# Patient Record
Sex: FEMALE | Race: WHITE | NOT HISPANIC OR LATINO
[De-identification: names, ages, dates, MRNs, and addresses within clinical notes are randomized per-mention and may not be internally consistent; named-entity substitution may affect disease eponyms.]

---

## 2017-01-17 ENCOUNTER — APPOINTMENT (OUTPATIENT)
Dept: OBGYN | Facility: CLINIC | Age: 22
End: 2017-01-17

## 2017-01-17 VITALS
WEIGHT: 151 LBS | BODY MASS INDEX: 26.75 KG/M2 | SYSTOLIC BLOOD PRESSURE: 103 MMHG | HEIGHT: 63 IN | DIASTOLIC BLOOD PRESSURE: 65 MMHG | HEART RATE: 68 BPM

## 2017-01-17 DIAGNOSIS — Z01.419 ENCOUNTER FOR GYNECOLOGICAL EXAMINATION (GENERAL) (ROUTINE) W/OUT ABNORMAL FINDINGS: ICD-10-CM

## 2017-01-17 DIAGNOSIS — N76.0 ACUTE VAGINITIS: ICD-10-CM

## 2017-01-17 DIAGNOSIS — Z30.09 ENCOUNTER FOR OTHER GENERAL COUNSELING AND ADVICE ON CONTRACEPTION: ICD-10-CM

## 2017-01-17 DIAGNOSIS — B96.89 ACUTE VAGINITIS: ICD-10-CM

## 2017-01-24 LAB
C TRACH DNA SPEC QL NAA+PROBE: NORMAL
C TRACH RRNA SPEC QL NAA+PROBE: NORMAL
CYTOLOGY CVX/VAG DOC THIN PREP: NORMAL
HBV SURFACE AG SER QL: NONREACTIVE
HCV AB SER QL: NONREACTIVE
HCV S/CO RATIO: 0.14 S/CO
HIV1+2 AB SPEC QL IA.RAPID: NONREACTIVE
N GONORRHOEA DNA SPEC QL NAA+PROBE: NORMAL
N GONORRHOEA RRNA SPEC QL NAA+PROBE: NORMAL
SOURCE AMPLIFICATION: NORMAL
T PALLIDUM AB SER QL IA: NEGATIVE

## 2017-07-13 ENCOUNTER — APPOINTMENT (OUTPATIENT)
Dept: ORTHOPEDIC SURGERY | Facility: CLINIC | Age: 22
End: 2017-07-13

## 2017-07-13 VITALS
HEIGHT: 63 IN | WEIGHT: 145 LBS | SYSTOLIC BLOOD PRESSURE: 110 MMHG | BODY MASS INDEX: 25.69 KG/M2 | DIASTOLIC BLOOD PRESSURE: 72 MMHG | HEART RATE: 69 BPM

## 2017-09-05 ENCOUNTER — RX RENEWAL (OUTPATIENT)
Age: 22
End: 2017-09-05

## 2017-10-20 ENCOUNTER — APPOINTMENT (OUTPATIENT)
Dept: ORTHOPEDIC SURGERY | Facility: CLINIC | Age: 22
End: 2017-10-20
Payer: COMMERCIAL

## 2017-10-20 PROCEDURE — 99213 OFFICE O/P EST LOW 20 MIN: CPT

## 2017-12-20 ENCOUNTER — TRANSCRIPTION ENCOUNTER (OUTPATIENT)
Age: 22
End: 2017-12-20

## 2018-01-19 ENCOUNTER — APPOINTMENT (OUTPATIENT)
Dept: OBGYN | Facility: CLINIC | Age: 23
End: 2018-01-19
Payer: COMMERCIAL

## 2018-01-19 VITALS
WEIGHT: 154 LBS | HEIGHT: 63 IN | SYSTOLIC BLOOD PRESSURE: 115 MMHG | DIASTOLIC BLOOD PRESSURE: 76 MMHG | HEART RATE: 68 BPM | BODY MASS INDEX: 27.29 KG/M2

## 2018-01-19 DIAGNOSIS — Z87.42 PERSONAL HISTORY OF OTHER DISEASES OF THE FEMALE GENITAL TRACT: ICD-10-CM

## 2018-01-19 DIAGNOSIS — Z78.9 OTHER SPECIFIED HEALTH STATUS: ICD-10-CM

## 2018-01-19 PROCEDURE — 99395 PREV VISIT EST AGE 18-39: CPT

## 2018-01-22 ENCOUNTER — OTHER (OUTPATIENT)
Age: 23
End: 2018-01-22

## 2018-01-22 LAB
C TRACH RRNA SPEC QL NAA+PROBE: NOT DETECTED
N GONORRHOEA RRNA SPEC QL NAA+PROBE: NOT DETECTED
SOURCE AMPLIFICATION: NORMAL

## 2018-04-26 ENCOUNTER — APPOINTMENT (OUTPATIENT)
Dept: ORTHOPEDIC SURGERY | Facility: CLINIC | Age: 23
End: 2018-04-26

## 2018-05-01 ENCOUNTER — FORM ENCOUNTER (OUTPATIENT)
Age: 23
End: 2018-05-01

## 2018-05-02 ENCOUNTER — APPOINTMENT (OUTPATIENT)
Dept: MRI IMAGING | Facility: CLINIC | Age: 23
End: 2018-05-02
Payer: COMMERCIAL

## 2018-05-02 ENCOUNTER — OUTPATIENT (OUTPATIENT)
Dept: OUTPATIENT SERVICES | Facility: HOSPITAL | Age: 23
LOS: 1 days | End: 2018-05-02
Payer: COMMERCIAL

## 2018-05-02 DIAGNOSIS — Z00.8 ENCOUNTER FOR OTHER GENERAL EXAMINATION: ICD-10-CM

## 2018-05-02 PROCEDURE — 73721 MRI JNT OF LWR EXTRE W/O DYE: CPT | Mod: 26,RT

## 2018-05-02 PROCEDURE — 73721 MRI JNT OF LWR EXTRE W/O DYE: CPT

## 2018-05-09 ENCOUNTER — APPOINTMENT (OUTPATIENT)
Dept: ORTHOPEDIC SURGERY | Facility: CLINIC | Age: 23
End: 2018-05-09
Payer: COMMERCIAL

## 2018-05-09 VITALS
DIASTOLIC BLOOD PRESSURE: 67 MMHG | HEART RATE: 66 BPM | WEIGHT: 150 LBS | HEIGHT: 63 IN | SYSTOLIC BLOOD PRESSURE: 107 MMHG | BODY MASS INDEX: 26.58 KG/M2

## 2018-05-09 DIAGNOSIS — Z78.9 OTHER SPECIFIED HEALTH STATUS: ICD-10-CM

## 2018-05-09 PROCEDURE — 99214 OFFICE O/P EST MOD 30 MIN: CPT

## 2018-05-17 ENCOUNTER — APPOINTMENT (OUTPATIENT)
Dept: ORTHOPEDIC SURGERY | Facility: CLINIC | Age: 23
End: 2018-05-17
Payer: COMMERCIAL

## 2018-05-17 VITALS — BODY MASS INDEX: 26.58 KG/M2 | HEIGHT: 63 IN | WEIGHT: 150 LBS

## 2018-05-17 PROCEDURE — 99214 OFFICE O/P EST MOD 30 MIN: CPT

## 2018-05-18 ENCOUNTER — OTHER (OUTPATIENT)
Age: 23
End: 2018-05-18

## 2018-05-21 ENCOUNTER — APPOINTMENT (OUTPATIENT)
Dept: CT IMAGING | Facility: CLINIC | Age: 23
End: 2018-05-21
Payer: COMMERCIAL

## 2018-05-21 ENCOUNTER — OUTPATIENT (OUTPATIENT)
Dept: OUTPATIENT SERVICES | Facility: HOSPITAL | Age: 23
LOS: 1 days | End: 2018-05-21
Payer: COMMERCIAL

## 2018-05-21 DIAGNOSIS — M22.41 CHONDROMALACIA PATELLAE, RIGHT KNEE: ICD-10-CM

## 2018-05-21 DIAGNOSIS — M25.569 PAIN IN UNSPECIFIED KNEE: ICD-10-CM

## 2018-05-21 DIAGNOSIS — S83.011D LATERAL SUBLUXATION OF RIGHT PATELLA, SUBSEQUENT ENCOUNTER: ICD-10-CM

## 2018-05-21 PROCEDURE — 73700 CT LOWER EXTREMITY W/O DYE: CPT

## 2018-05-21 PROCEDURE — 73700 CT LOWER EXTREMITY W/O DYE: CPT | Mod: 26,RT

## 2018-05-23 ENCOUNTER — APPOINTMENT (OUTPATIENT)
Dept: ORTHOPEDIC SURGERY | Facility: CLINIC | Age: 23
End: 2018-05-23
Payer: COMMERCIAL

## 2018-05-23 PROCEDURE — 99214 OFFICE O/P EST MOD 30 MIN: CPT

## 2018-06-07 ENCOUNTER — OUTPATIENT (OUTPATIENT)
Dept: OUTPATIENT SERVICES | Facility: HOSPITAL | Age: 23
LOS: 1 days | End: 2018-06-07

## 2018-06-07 VITALS
DIASTOLIC BLOOD PRESSURE: 60 MMHG | RESPIRATION RATE: 14 BRPM | WEIGHT: 151.9 LBS | TEMPERATURE: 98 F | HEART RATE: 70 BPM | SYSTOLIC BLOOD PRESSURE: 98 MMHG | HEIGHT: 64 IN

## 2018-06-07 DIAGNOSIS — Z90.89 ACQUIRED ABSENCE OF OTHER ORGANS: Chronic | ICD-10-CM

## 2018-06-07 DIAGNOSIS — M24.9 JOINT DERANGEMENT, UNSPECIFIED: ICD-10-CM

## 2018-06-07 DIAGNOSIS — M25.361 OTHER INSTABILITY, RIGHT KNEE: ICD-10-CM

## 2018-06-07 DIAGNOSIS — Z96.22 MYRINGOTOMY TUBE(S) STATUS: Chronic | ICD-10-CM

## 2018-06-07 LAB
HCT VFR BLD CALC: 39.6 % — SIGNIFICANT CHANGE UP (ref 34.5–45)
HGB BLD-MCNC: 13.6 G/DL — SIGNIFICANT CHANGE UP (ref 11.5–15.5)
MCHC RBC-ENTMCNC: 30.5 PG — SIGNIFICANT CHANGE UP (ref 27–34)
MCHC RBC-ENTMCNC: 34.3 % — SIGNIFICANT CHANGE UP (ref 32–36)
MCV RBC AUTO: 88.8 FL — SIGNIFICANT CHANGE UP (ref 80–100)
NRBC # FLD: 0 — SIGNIFICANT CHANGE UP
PLATELET # BLD AUTO: 240 K/UL — SIGNIFICANT CHANGE UP (ref 150–400)
PMV BLD: 10.4 FL — SIGNIFICANT CHANGE UP (ref 7–13)
RBC # BLD: 4.46 M/UL — SIGNIFICANT CHANGE UP (ref 3.8–5.2)
RBC # FLD: 11.9 % — SIGNIFICANT CHANGE UP (ref 10.3–14.5)
WBC # BLD: 5.09 K/UL — SIGNIFICANT CHANGE UP (ref 3.8–10.5)
WBC # FLD AUTO: 5.09 K/UL — SIGNIFICANT CHANGE UP (ref 3.8–10.5)

## 2018-06-07 NOTE — H&P PST ADULT - NSANTHOSAYNRD_GEN_A_CORE
No. TONY screening performed.  STOP BANG Legend: 0-2 = LOW Risk; 3-4 = INTERMEDIATE Risk; 5-8 = HIGH Risk

## 2018-06-07 NOTE — H&P PST ADULT - MUSCULOSKELETAL COMMENTS
right knee no cartilage, patella deviated pain with rom right knee no cartilage, patella deviated, pain with climbing stairs

## 2018-06-07 NOTE — H&P PST ADULT - NEGATIVE GENERAL SYMPTOMS
no fever/no sweating/no chills/no anorexia/no weight gain/no polyphagia/no polyuria/no malaise/no weight loss/no polydipsia/no fatigue

## 2018-06-07 NOTE — H&P PST ADULT - RS GEN PE MLT RESP DETAILS PC
no chest wall tenderness/no rhonchi/no wheezes/respirations non-labored/good air movement/breath sounds equal/no rales/clear to auscultation bilaterally/no intercostal retractions

## 2018-06-07 NOTE — H&P PST ADULT - PROBLEM SELECTOR PLAN 1
Pt scheduled for right knee dx knee arthroscopy, extra articular ligament reconstruction medial patellofemoral ligament reconstruction on 6/20/2018.  labs done results pending.  Urine cup provided.  Preop teaching done, pt able to verbalize understanding.

## 2018-06-07 NOTE — H&P PST ADULT - NEGATIVE BREAST SYMPTOMS
no breast lump L/no nipple discharge L/no breast tenderness L/no breast lump R/no nipple discharge R

## 2018-06-07 NOTE — H&P PST ADULT - GASTROINTESTINAL DETAILS
no bruit/no guarding/nontender/no rebound tenderness/no rigidity/no organomegaly/no masses palpable/bowel sounds normal/soft

## 2018-06-07 NOTE — H&P PST ADULT - NEGATIVE GENERAL GENITOURINARY SYMPTOMS
normal urinary frequency/no flank pain R/no bladder infections/no dysuria/no hematuria/no flank pain L

## 2018-06-07 NOTE — H&P PST ADULT - HISTORY OF PRESENT ILLNESS
21y/o female scheduled for right knee dx knee arthroscopy, extra articular ligament reconstruction medial patellofemoral ligament reconstruction on 6/20/2018.  Pt states, dislocated right knee 2008, 2017 while dancing.  MRI, cat scan no cartilage be hind knee cap, patella deviated to the side. Right knee pain with climbing stairs."

## 2018-06-19 ENCOUNTER — TRANSCRIPTION ENCOUNTER (OUTPATIENT)
Age: 23
End: 2018-06-19

## 2018-06-20 ENCOUNTER — OUTPATIENT (OUTPATIENT)
Dept: OUTPATIENT SERVICES | Facility: HOSPITAL | Age: 23
LOS: 1 days | Discharge: HOME | End: 2018-06-20
Payer: COMMERCIAL

## 2018-06-20 ENCOUNTER — APPOINTMENT (OUTPATIENT)
Dept: ORTHOPEDIC SURGERY | Facility: AMBULATORY SURGERY CENTER | Age: 23
End: 2018-06-20

## 2018-06-20 VITALS
OXYGEN SATURATION: 100 % | TEMPERATURE: 98 F | HEART RATE: 76 BPM | WEIGHT: 151.9 LBS | DIASTOLIC BLOOD PRESSURE: 67 MMHG | SYSTOLIC BLOOD PRESSURE: 107 MMHG | HEIGHT: 64 IN | RESPIRATION RATE: 14 BRPM

## 2018-06-20 VITALS
DIASTOLIC BLOOD PRESSURE: 67 MMHG | TEMPERATURE: 98 F | OXYGEN SATURATION: 100 % | SYSTOLIC BLOOD PRESSURE: 163 MMHG | HEART RATE: 81 BPM

## 2018-06-20 DIAGNOSIS — Z90.89 ACQUIRED ABSENCE OF OTHER ORGANS: Chronic | ICD-10-CM

## 2018-06-20 DIAGNOSIS — M25.361 OTHER INSTABILITY, RIGHT KNEE: ICD-10-CM

## 2018-06-20 DIAGNOSIS — Z96.22 MYRINGOTOMY TUBE(S) STATUS: Chronic | ICD-10-CM

## 2018-06-20 PROCEDURE — 27427 RECONSTRUCTION KNEE: CPT | Mod: RT

## 2018-06-20 PROCEDURE — 29879 ARTHRS KNE SRG ABRASJ ARTHRP: CPT | Mod: RT

## 2018-06-20 RX ORDER — ASPIRIN/CALCIUM CARB/MAGNESIUM 324 MG
1 TABLET ORAL
Qty: 30 | Refills: 0 | OUTPATIENT
Start: 2018-06-20 | End: 2018-07-19

## 2018-06-20 NOTE — ASU DISCHARGE PLAN (ADULT/PEDIATRIC). - MEDICATION SUMMARY - MEDICATIONS TO TAKE
I will START or STAY ON the medications listed below when I get home from the hospital:    Aspirin Enteric Coated 325 mg oral delayed release tablet  -- 1 tab(s) by mouth once a day for DVT ppx  -- Swallow whole.  Do not crush.  Take with food or milk.    -- Indication: For DVT ppx    oxyCODONE-acetaminophen 5 mg-325 mg oral tablet  -- 1 tab(s) by mouth every 4 hours, As Needed -for moderate pain MDD:8 tabs   -- Caution federal law prohibits the transfer of this drug to any person other  than the person for whom it was prescribed.  May cause drowsiness.  Alcohol may intensify this effect.  Use care when operating dangerous machinery.  This prescription cannot be refilled.  This product contains acetaminophen.  Do not use  with any other product containing acetaminophen to prevent possible liver damage.  Using more of this medication than prescribed may cause serious breathing problems.    -- Indication: For pain

## 2018-06-20 NOTE — ASU DISCHARGE PLAN (ADULT/PEDIATRIC). - NOTIFY
Bleeding that does not stop/Numbness, color, or temperature change to extremity Pain not relieved by Medications/Bleeding that does not stop/Persistent Nausea and Vomiting/Fever greater than 101/Numbness, color, or temperature change to extremity

## 2018-07-02 ENCOUNTER — APPOINTMENT (OUTPATIENT)
Dept: ORTHOPEDIC SURGERY | Facility: CLINIC | Age: 23
End: 2018-07-02
Payer: COMMERCIAL

## 2018-07-02 PROCEDURE — 99024 POSTOP FOLLOW-UP VISIT: CPT

## 2018-07-27 PROBLEM — Z78.9 ALCOHOL USE: Status: ACTIVE | Noted: 2018-01-19

## 2018-07-31 ENCOUNTER — APPOINTMENT (OUTPATIENT)
Dept: ORTHOPEDIC SURGERY | Facility: CLINIC | Age: 23
End: 2018-07-31
Payer: COMMERCIAL

## 2018-07-31 PROCEDURE — 99024 POSTOP FOLLOW-UP VISIT: CPT

## 2018-07-31 PROCEDURE — 73562 X-RAY EXAM OF KNEE 3: CPT | Mod: RT

## 2018-08-01 PROBLEM — M24.9 JOINT DERANGEMENT, UNSPECIFIED: Chronic | Status: ACTIVE | Noted: 2018-06-07

## 2018-08-20 ENCOUNTER — OTHER (OUTPATIENT)
Age: 23
End: 2018-08-20

## 2018-09-10 ENCOUNTER — APPOINTMENT (OUTPATIENT)
Dept: ORTHOPEDIC SURGERY | Facility: CLINIC | Age: 23
End: 2018-09-10
Payer: COMMERCIAL

## 2018-09-10 PROCEDURE — 99024 POSTOP FOLLOW-UP VISIT: CPT

## 2018-09-25 ENCOUNTER — APPOINTMENT (OUTPATIENT)
Dept: ORTHOPEDIC SURGERY | Facility: CLINIC | Age: 23
End: 2018-09-25
Payer: COMMERCIAL

## 2018-09-25 VITALS — HEART RATE: 87 BPM | SYSTOLIC BLOOD PRESSURE: 103 MMHG | DIASTOLIC BLOOD PRESSURE: 67 MMHG

## 2018-09-25 PROCEDURE — 99213 OFFICE O/P EST LOW 20 MIN: CPT

## 2018-11-05 ENCOUNTER — APPOINTMENT (OUTPATIENT)
Dept: ORTHOPEDIC SURGERY | Facility: CLINIC | Age: 23
End: 2018-11-05
Payer: COMMERCIAL

## 2018-11-05 VITALS
BODY MASS INDEX: 26.58 KG/M2 | DIASTOLIC BLOOD PRESSURE: 74 MMHG | HEIGHT: 63 IN | HEART RATE: 68 BPM | WEIGHT: 150 LBS | SYSTOLIC BLOOD PRESSURE: 106 MMHG

## 2018-11-05 PROCEDURE — 99213 OFFICE O/P EST LOW 20 MIN: CPT

## 2019-01-03 ENCOUNTER — APPOINTMENT (OUTPATIENT)
Dept: ORTHOPEDIC SURGERY | Facility: CLINIC | Age: 24
End: 2019-01-03
Payer: COMMERCIAL

## 2019-01-03 VITALS
BODY MASS INDEX: 26.58 KG/M2 | HEART RATE: 75 BPM | WEIGHT: 150 LBS | HEIGHT: 63 IN | SYSTOLIC BLOOD PRESSURE: 105 MMHG | DIASTOLIC BLOOD PRESSURE: 68 MMHG

## 2019-01-03 PROCEDURE — 99213 OFFICE O/P EST LOW 20 MIN: CPT

## 2019-01-08 ENCOUNTER — RX RENEWAL (OUTPATIENT)
Age: 24
End: 2019-01-08

## 2019-01-15 ENCOUNTER — MEDICATION RENEWAL (OUTPATIENT)
Age: 24
End: 2019-01-15

## 2019-03-10 ENCOUNTER — TRANSCRIPTION ENCOUNTER (OUTPATIENT)
Age: 24
End: 2019-03-10

## 2019-03-19 ENCOUNTER — APPOINTMENT (OUTPATIENT)
Dept: OBGYN | Facility: CLINIC | Age: 24
End: 2019-03-19
Payer: COMMERCIAL

## 2019-03-19 VITALS
DIASTOLIC BLOOD PRESSURE: 77 MMHG | WEIGHT: 150 LBS | HEIGHT: 63 IN | HEART RATE: 79 BPM | SYSTOLIC BLOOD PRESSURE: 124 MMHG | BODY MASS INDEX: 26.58 KG/M2

## 2019-03-19 DIAGNOSIS — S83.011D: ICD-10-CM

## 2019-03-19 DIAGNOSIS — Z01.419 ENCOUNTER FOR GYNECOLOGICAL EXAMINATION (GENERAL) (ROUTINE) W/OUT ABNORMAL FINDINGS: ICD-10-CM

## 2019-03-19 DIAGNOSIS — Z87.2 PERSONAL HISTORY OF DISEASES OF THE SKIN AND SUBCUTANEOUS TISSUE: ICD-10-CM

## 2019-03-19 DIAGNOSIS — M25.552 PAIN IN RIGHT HIP: ICD-10-CM

## 2019-03-19 DIAGNOSIS — M24.859 OTHER SPECIFIC JOINT DERANGEMENTS OF UNSPECIFIED HIP, NOT ELSEWHERE CLASSIFIED: ICD-10-CM

## 2019-03-19 DIAGNOSIS — Z30.41 ENCOUNTER FOR SURVEILLANCE OF CONTRACEPTIVE PILLS: ICD-10-CM

## 2019-03-19 DIAGNOSIS — M22.41 CHONDROMALACIA PATELLAE, RIGHT KNEE: ICD-10-CM

## 2019-03-19 DIAGNOSIS — Z11.3 ENCOUNTER FOR SCREENING FOR INFECTIONS WITH A PREDOMINANTLY SEXUAL MODE OF TRANSMISSION: ICD-10-CM

## 2019-03-19 DIAGNOSIS — M25.561 PAIN IN RIGHT KNEE: ICD-10-CM

## 2019-03-19 DIAGNOSIS — M25.551 PAIN IN RIGHT HIP: ICD-10-CM

## 2019-03-19 DIAGNOSIS — M25.569 PAIN IN UNSPECIFIED KNEE: ICD-10-CM

## 2019-03-19 PROCEDURE — 99395 PREV VISIT EST AGE 18-39: CPT

## 2019-03-19 RX ORDER — NORETHINDRONE ACETATE AND ETHINYL ESTRADIOL AND FERROUS FUMARATE 1MG-20(21)
1-20 KIT ORAL
Qty: 84 | Refills: 3 | Status: COMPLETED | COMMUNITY
Start: 2017-01-17 | End: 2019-03-19

## 2019-03-19 NOTE — HISTORY OF PRESENT ILLNESS
[1 Year Ago] : 1 year ago [Good] : being in good health [Last Pap Smear ___] : last Papanicolaou cytology done [unfilled] [Reproductive Age] : is of reproductive age [Up to Date] : up to date with ~his/her~ immunizations [HPV Vaccine ___] : HPV vaccine [unfilled] [Sexually Active] : is not sexually active

## 2019-03-19 NOTE — PHYSICAL EXAM
[Awake] : awake [Alert] : alert [Acute Distress] : no acute distress [LAD] : no lymphadenopathy [Thyroid Nodule] : no thyroid nodule [Goiter] : no goiter [Mass] : no breast mass [Nipple Discharge] : no nipple discharge [Axillary LAD] : no axillary lymphadenopathy [Tender] : non tender [Soft] : soft [Distended] : not distended [None] : no CVA tenderness [Oriented x3] : oriented to person, place, and time [Depressed Mood] : not depressed [Normal] : uterus [Pap Obtained] : a Pap smear was not performed [No Bleeding] : there was no active vaginal bleeding [Anteversion] : anteverted [Tenderness] : nontender [Uterine Adnexae] : were not tender and not enlarged

## 2019-03-19 NOTE — COUNSELING
[Nutrition] : nutrition [Breast Self Exam] : breast self exam [Exercise] : exercise [STD (testing, results, tx)] : STD (testing, results, tx) [Vitamins/Supplements] : vitamins/supplements [HIV Pretest] : HIV pretest

## 2019-03-25 LAB
C TRACH RRNA SPEC QL NAA+PROBE: NOT DETECTED
HBV SURFACE AG SER QL: NONREACTIVE
HCV AB SER QL: NONREACTIVE
HCV S/CO RATIO: 0.19 S/CO
HIV1+2 AB SPEC QL IA.RAPID: NONREACTIVE
N GONORRHOEA RRNA SPEC QL NAA+PROBE: NOT DETECTED
SOURCE AMPLIFICATION: NORMAL
T PALLIDUM AB SER QL IA: NEGATIVE

## 2019-04-11 ENCOUNTER — APPOINTMENT (OUTPATIENT)
Dept: ORTHOPEDIC SURGERY | Facility: CLINIC | Age: 24
End: 2019-04-11
Payer: COMMERCIAL

## 2019-04-11 DIAGNOSIS — M25.361 OTHER INSTABILITY, RIGHT KNEE: ICD-10-CM

## 2019-04-11 PROCEDURE — 99213 OFFICE O/P EST LOW 20 MIN: CPT

## 2019-04-11 NOTE — DISCUSSION/SUMMARY
[de-identified] : 23 y.o. female s/p right knee MPFL reconstruction (Ham auto), microfx lateral patella facet\par \par The patient has minimal symptoms, with mild complaints of stiffness with sitting for periods of time, with residual crepitus within the patellofemoral joint. No significant dysfunction, with continued improvement from preoperative function. Patient has continued mild quadriceps atrophy in addition.\par \par Discussed with family and patient continued concerns regarding lateral facet of the patella, and future consideration of chondral resurfacing procedures if symptoms persist and warrant further intervention. However, at this time patient is ultimately doing well and is happy with her postoperative function.\par \par Recommendation: Transition to home exercise program, discontinue PT. Continue quadriceps strengthening\par \par Followup as needed

## 2019-04-11 NOTE — HISTORY OF PRESENT ILLNESS
[All Other ROS Normal] : All other review of systems are negative except as noted [Past Hx] : past medical [Fam Hx] : family [Soc Hx] : social [All] : medication and allergy [FreeTextEntry1] : F/U  [FreeTextEntry2] : 23 y.o.female s/p right knee MPFL reconstruction (Ham auto), microfx lateral patella facet 6.20.18. Patient is doing well. Completed PT last week. Reports slight pain with running, prolonged walking recently. Denies instability.

## 2019-04-11 NOTE — PHYSICAL EXAM
[Normal] : normal [Poor Appearance] : well-appearing [FreeTextEntry2] : Oriented to time, place, person\par Mood: Normal\par Affect: Normal\par \par Right knee exam\par \par Skin: Clean, dry, intact\par Inspection: No obvious malalignment, no masses, no swelling, no effusion, no J-sign, mild patellofemoral crepitus\par Pulses: 2+ DP/PT pulses\par ROM: 0-135 degrees of flexion. Anterior pain with knee flexion/extension. Mild recurvatum\par Tenderness: No MJLT. No LJLT. Min lateral facet pain over the patella facets. No pain to the quadriceps tendon. No pain to the patella tendon. No posterior knee tenderness.\par Stability: Stable to varus, valgus. Negative lachman testing. Negative anterior drawer, negative posterior drawer.\par Patella: 2 quadrant instability medially/laterally. Unable to lateral tilt patella. \par Strength: 5/5 Q/H/TA/GS/EHL, without atrophy\par Neuro: In tact to light touch throughout, DTR's normal\par Additional tests: Negative McMurrays test, mild patellar grind test. \par \par  [Obese] : not obese [Acute Distress] : not in acute distress

## 2019-06-10 ENCOUNTER — APPOINTMENT (OUTPATIENT)
Dept: ORTHOPEDIC SURGERY | Facility: CLINIC | Age: 24
End: 2019-06-10
Payer: COMMERCIAL

## 2019-06-10 VITALS
DIASTOLIC BLOOD PRESSURE: 73 MMHG | HEART RATE: 79 BPM | WEIGHT: 150 LBS | BODY MASS INDEX: 26.58 KG/M2 | SYSTOLIC BLOOD PRESSURE: 107 MMHG | HEIGHT: 63 IN

## 2019-06-10 DIAGNOSIS — M76.32 ILIOTIBIAL BAND SYNDROME, LEFT LEG: ICD-10-CM

## 2019-06-10 PROCEDURE — 99214 OFFICE O/P EST MOD 30 MIN: CPT

## 2019-06-10 PROCEDURE — 73562 X-RAY EXAM OF KNEE 3: CPT | Mod: LT

## 2019-06-12 PROBLEM — M76.32 ILIOTIBIAL BAND SYNDROME, LEFT: Status: ACTIVE | Noted: 2019-06-12

## 2019-06-12 NOTE — HISTORY OF PRESENT ILLNESS
[de-identified] : 23 year old female presents today with left knee pain x 2-3 months. No injury to the left knee. She reports that when she goes into a deep squat  a "bone"  in her knee pops of place and when she extends it pop back in.  Pain is only presents when the popping happens other wise she has no pain. Denies buckling, catching locking. Also s/p right knee MPFL reconstruction (Ham auto), microfx lateral patella facet 6.20.18. She completed PT in March but continues to have stiffness in the knee. She has difficulty lifting weights with her leg.

## 2019-06-12 NOTE — PHYSICAL EXAM
[de-identified] : General: well-appearing, not in acute distress and not obese. \par Gait: normal. \par Oriented to time, place, person\par Mood: Normal\par Affect: Normal\par \par Right knee exam\par \par Skin: Clean, dry, intact\par Inspection: No obvious malalignment, no masses, no swelling, no effusion, no J-sign, mild patellofemoral crepitus\par Pulses: 2+ DP/PT pulses\par ROM: 0-135 degrees of flexion. Anterior pain with knee flexion/extension. Mild recurvatum\par Tenderness: No MJLT. No LJLT. Min lateral facet pain over the patella facets. No pain to the quadriceps tendon. No pain to the patella tendon. No posterior knee tenderness.\par Stability: Stable to varus, valgus. Negative lachman testing. Negative anterior drawer, negative posterior drawer.\par Patella: 2 quadrant instability medially/laterally. Unable to lateral tilt patella. \par Strength: 5/5 Q/H/TA/GS/EHL, without atrophy\par Neuro: In tact to light touch throughout, DTR's normal\par Additional tests: Negative McMurrays test, mild patellar grind test. \par \par Left knee exam\par \par Skin: Clean, dry, intact\par Inspection: No obvious malalignment, no masses, no swelling, no effusion\par Pulses: 2+ DP/PT pulses\par ROM: 0-135 degrees of flexion. No pain with deep knee flexion/extension.\par Tenderness: No MJLT. No LJLT. No pain over the patella facets. No pain to the quadriceps tendon. No pain to the patella tendon. No posterior knee tenderness. Tender to palpation of the iliotibial band\par Stability: Stable to varus, valgus. Negative lachman testing. Negative anterior drawer, negative posterior drawer.\par Strength: 5/5 Q/H/TA/GS/EHL, without atrophy\par Neuro: In tact to light touch throughout, DTR's normal\par Additional tests: Negative McMurrays test, Negative patellar grind test. \par \par  [de-identified] : \par The following radiographs were ordered and read by me during this patients visit. I reviewed each radiograph in detail with the patient and discussed the findings as highlighted below. \par \par 3 views of the left knee were obtained today that show no acute fracture or dislocation. There is no degenerative change seen. There is no gross malalignment. No significant other obvious osseous abnormality, otherwise unremarkable.

## 2019-06-12 NOTE — DISCUSSION/SUMMARY
[de-identified] : 23-year-old female with left knee pain\par \par Symptoms are consistent with discomfort of the left iliotibial band at the level of the lateral femoral condyle. Patient does report a mechanical catching in this location. Recommendations were made for symptomatic treatment. Patient also requesting additional therapy for her right knee, he given continued lack of full strength.\par \par Prescription provided. Stretching exercises provided left knee.\par \par Followup as needed\par

## 2020-03-04 ENCOUNTER — RX RENEWAL (OUTPATIENT)
Age: 25
End: 2020-03-04

## 2020-03-13 ENCOUNTER — TRANSCRIPTION ENCOUNTER (OUTPATIENT)
Age: 25
End: 2020-03-13

## 2020-03-15 RX ORDER — NORETHINDRONE ACETATE AND ETHINYL ESTRADIOL AND FERROUS FUMARATE 1MG-20(21)
1-20 KIT ORAL
Qty: 84 | Refills: 0 | Status: ACTIVE | COMMUNITY
Start: 2018-01-19 | End: 1900-01-01

## 2020-05-06 ENCOUNTER — APPOINTMENT (OUTPATIENT)
Dept: OBGYN | Facility: CLINIC | Age: 25
End: 2020-05-06

## 2020-08-23 ENCOUNTER — RX RENEWAL (OUTPATIENT)
Age: 25
End: 2020-08-23

## 2020-08-23 RX ORDER — NORETHINDRONE ACETATE/ETHINYL ESTRADIOL AND FERROUS FUMARATE 1MG-20(21)
1-20 KIT ORAL
Qty: 84 | Refills: 0 | Status: ACTIVE | COMMUNITY
Start: 2020-08-23 | End: 1900-01-01

## 2020-10-02 NOTE — ASU PREOPERATIVE ASSESSMENT, ADULT (IPARK ONLY) - NS PRO LAST MENSTRUAL DATE
[email or web address] 6/7/2018

## 2020-12-31 ENCOUNTER — TRANSCRIPTION ENCOUNTER (OUTPATIENT)
Age: 25
End: 2020-12-31

## 2021-07-25 ENCOUNTER — TRANSCRIPTION ENCOUNTER (OUTPATIENT)
Age: 26
End: 2021-07-25

## 2021-11-10 ENCOUNTER — TRANSCRIPTION ENCOUNTER (OUTPATIENT)
Age: 26
End: 2021-11-10

## 2021-12-08 ENCOUNTER — TRANSCRIPTION ENCOUNTER (OUTPATIENT)
Age: 26
End: 2021-12-08

## 2022-05-18 NOTE — ASU DISCHARGE PLAN (ADULT/PEDIATRIC). - DRIVING
--Patient arrived to unit and transferred into bed without incident. --Patient oriented to room. --A&Ox4  --VSS.  --Call light and bedside table within reach  --Family at bedside  --POC discussed  --Call light and bedside table within reach.  Bed in locked, low position  --No acute distress      Problem: Discharge Planning  Goal: Discharge to home or other facility with appropriate resources  Outcome: Progressing     Problem: Pain  Goal: Verbalizes/displays adequate comfort level or baseline comfort level  Outcome: Progressing     Problem: Safety - Adult  Goal: Free from fall injury  Outcome: Progressing When cleared by MD/No

## 2023-04-04 ENCOUNTER — APPOINTMENT (OUTPATIENT)
Dept: ORTHOPEDIC SURGERY | Facility: CLINIC | Age: 28
End: 2023-04-04
Payer: COMMERCIAL

## 2023-04-04 VITALS — HEIGHT: 63 IN | WEIGHT: 155 LBS | BODY MASS INDEX: 27.46 KG/M2

## 2023-04-04 DIAGNOSIS — M25.362 OTHER INSTABILITY, LEFT KNEE: ICD-10-CM

## 2023-04-04 PROCEDURE — 99213 OFFICE O/P EST LOW 20 MIN: CPT

## 2023-04-04 PROCEDURE — 73562 X-RAY EXAM OF KNEE 3: CPT | Mod: LT

## 2023-04-06 PROBLEM — M25.362 PATELLAR INSTABILITY OF LEFT KNEE: Status: ACTIVE | Noted: 2023-04-06

## 2023-04-06 NOTE — DISCUSSION/SUMMARY
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 \par \par Patient presents with evidence of left patella instability.  There is no gross effusion or dysfunction, which may be consistent with more of a subluxation episode versus maikol traumatic dislocation.  However, patient is status post prior MPFL reconstruction in 2018 on the contralateral side.  We discussed and recommended gradual return to activities under the guidance of physical therapy without additional imaging at this time. If the patient were to continue to have instability or a maikol dislocation event we discussed potential need for patella stabilization.\par \par The patient is electing to proceed with nonoperative management, and we will defer further imaging with an MRI if there is continued patella instability. The patient was counseled on brace use, icing, and the use of anti-inflammatories medications and over-the-counter pain medication as needed. \par \par Recommendations: Begin trial of PT, Rx given. Brace use. NSAIDs/Ice prn. \par \par Follow-up 3 months as needed

## 2023-04-06 NOTE — HISTORY OF PRESENT ILLNESS
[de-identified] : 27 year old female presents today for evaluation of left knee instability. She dislocated her patella dancing last week. This is first dislocation for the left knee. Patella reduced on its own. The pain has resolved. C/O stiffness/ instability. She is ambulating with one crutch  to avoid putting pressure on the leg. She is currently taking pain medication.  Status post right knee MPFL & microfx lateral patella facet 2018. \par \par The patient's past medical history, past surgical history, medications and allergies were reviewed by me today with the patient and documented accordingly. In addition, the patient's family and social history, which were noncontributory to this visit, were reviewed also.

## 2023-04-06 NOTE — ADDENDUM
[FreeTextEntry1] : This note was written by Brooke Lovett on 04/04/2023 acting solely as a scribe for Dr. Efrain Rashid.\par \par All medical record entries made by the Scribe were at my, Dr. Efrain Rashid, direction and personally dictated by me on 04/04/2023. I have personally reviewed the chart and agree that the record accurately reflects my personal performance of the history, physical exam, assessment and plan.

## 2023-04-06 NOTE — PHYSICAL EXAM
[de-identified] : Oriented to time, place, person\par Mood: Normal\par Affect: Normal\par Appearance: Healthy, well appearing, no acute distress.\par Gait: Antalgic\par Assistive Devices: Crutch\par \par Left Knee Exam:\par \par Skin: Clean, dry, intact\par Inspection: No obvious malalignment, no masses, no swelling, no effusion\par Pulses: 2+ DP/PT pulses \par ROM: 0-135 degrees of flexion. No pain with deep knee flexion/extension.\par Tenderness: No MJLT. No LJLT.  Minimal medial pain over the patella facets. No pain to the quadriceps tendon. No pain to the patella tendon. No posterior knee tenderness.\par Stability: Stable to varus, valgus. Negative Lachman testing. Negative anterior drawer, negative posterior drawer. 2 quadrant lateral patella instability. \par Strength: 5/5 Q/H/TA/GS/EHL, without atrophy\par Neuro: Intact to light touch throughout, DTRs normal\par Additional Tests: Negative Dilcia's test, Negative patellar grind test  [de-identified] : The following radiographs were ordered and read by me during this patients visit. I reviewed each radiograph in detail with the patient and discussed the findings as highlighted below. \par \par 4 views of the left knee were obtained today, 04/04/2023, that show no acute fracture or dislocation. There is no medial, no lateral and no patellofemoral degenerative changes seen. There is no significant malalignment. No significant other obvious osseous abnormality, otherwise unremarkable.

## 2024-11-27 ENCOUNTER — APPOINTMENT (OUTPATIENT)
Dept: ORTHOPEDIC SURGERY | Facility: CLINIC | Age: 29
End: 2024-11-27
Payer: COMMERCIAL

## 2024-11-27 VITALS
SYSTOLIC BLOOD PRESSURE: 109 MMHG | HEART RATE: 94 BPM | HEIGHT: 63 IN | DIASTOLIC BLOOD PRESSURE: 74 MMHG | WEIGHT: 150 LBS | BODY MASS INDEX: 26.58 KG/M2

## 2024-11-27 DIAGNOSIS — M25.511 PAIN IN RIGHT SHOULDER: ICD-10-CM

## 2024-11-27 DIAGNOSIS — M25.512 PAIN IN RIGHT SHOULDER: ICD-10-CM

## 2024-11-27 DIAGNOSIS — M75.82 OTHER SHOULDER LESIONS, LEFT SHOULDER: ICD-10-CM

## 2024-11-27 PROCEDURE — 73030 X-RAY EXAM OF SHOULDER: CPT | Mod: RT

## 2024-11-27 PROCEDURE — 99213 OFFICE O/P EST LOW 20 MIN: CPT

## 2025-01-09 NOTE — H&P PST ADULT - CONSTITUTIONAL DETAILS
well-groomed/well-nourished/well-developed/no distress
"I cut my finger nail and 2 days later it began to swell and pulsate. I went to urgent care and they prescribed me antibiotics, Tylenol and soaking"